# Patient Record
Sex: MALE | Race: WHITE
[De-identification: names, ages, dates, MRNs, and addresses within clinical notes are randomized per-mention and may not be internally consistent; named-entity substitution may affect disease eponyms.]

---

## 2018-05-14 ENCOUNTER — HOSPITAL ENCOUNTER (EMERGENCY)
Dept: HOSPITAL 45 - C.EDB | Age: 83
Discharge: HOME | End: 2018-05-14
Payer: COMMERCIAL

## 2018-05-14 VITALS — DIASTOLIC BLOOD PRESSURE: 67 MMHG | SYSTOLIC BLOOD PRESSURE: 177 MMHG | OXYGEN SATURATION: 95 % | HEART RATE: 60 BPM

## 2018-05-14 VITALS — TEMPERATURE: 97.7 F

## 2018-05-14 DIAGNOSIS — Z85.828: ICD-10-CM

## 2018-05-14 DIAGNOSIS — Z98.890: ICD-10-CM

## 2018-05-14 DIAGNOSIS — E78.5: ICD-10-CM

## 2018-05-14 DIAGNOSIS — I10: ICD-10-CM

## 2018-05-14 DIAGNOSIS — Z79.899: ICD-10-CM

## 2018-05-14 DIAGNOSIS — K21.9: ICD-10-CM

## 2018-05-14 DIAGNOSIS — R21: Primary | ICD-10-CM

## 2018-05-14 DIAGNOSIS — Z88.8: ICD-10-CM

## 2018-05-14 DIAGNOSIS — Z79.01: ICD-10-CM

## 2018-05-14 DIAGNOSIS — Z79.82: ICD-10-CM

## 2018-05-14 LAB
ALBUMIN SERPL-MCNC: 3.3 GM/DL (ref 3.4–5)
ALP SERPL-CCNC: 189 U/L (ref 45–117)
ALT SERPL-CCNC: 49 U/L (ref 12–78)
AST SERPL-CCNC: 50 U/L (ref 15–37)
BASOPHILS # BLD: 0.04 K/UL (ref 0–0.2)
BASOPHILS NFR BLD: 0.6 %
BUN SERPL-MCNC: 41 MG/DL (ref 7–18)
CALCIUM SERPL-MCNC: 8.6 MG/DL (ref 8.5–10.1)
CK MB SERPL-MCNC: 2 NG/ML (ref 0.5–3.6)
CO2 SERPL-SCNC: 22 MMOL/L (ref 21–32)
CREAT SERPL-MCNC: 2.13 MG/DL (ref 0.6–1.4)
EOS ABS #: 0.18 K/UL (ref 0–0.5)
EOSINOPHIL NFR BLD AUTO: 176 K/UL (ref 130–400)
GLUCOSE SERPL-MCNC: 150 MG/DL (ref 70–99)
HCT VFR BLD CALC: 33.4 % (ref 42–52)
HGB BLD-MCNC: 11.1 G/DL (ref 14–18)
IG#: 0.01 K/UL (ref 0–0.02)
IMM GRANULOCYTES NFR BLD AUTO: 9.6 %
INR PPP: 3.7 (ref 0.9–1.1)
LYMPHOCYTES # BLD: 0.6 K/UL (ref 1.2–3.4)
MCH RBC QN AUTO: 28.2 PG (ref 25–34)
MCHC RBC AUTO-ENTMCNC: 33.2 G/DL (ref 32–36)
MCV RBC AUTO: 84.8 FL (ref 80–100)
MONO ABS #: 0.56 K/UL (ref 0.11–0.59)
MONOCYTES NFR BLD: 8.9 %
NEUT ABS #: 4.87 K/UL (ref 1.4–6.5)
NEUTROPHILS # BLD AUTO: 2.9 %
NEUTROPHILS NFR BLD AUTO: 77.8 %
PMV BLD AUTO: 8.8 FL (ref 7.4–10.4)
POTASSIUM SERPL-SCNC: 5.2 MMOL/L (ref 3.5–5.1)
PROT SERPL-MCNC: 7.3 GM/DL (ref 6.4–8.2)
PTT PATIENT: 53.9 SECONDS (ref 21–31)
RED CELL DISTRIBUTION WIDTH CV: 15.6 % (ref 11.5–14.5)
RED CELL DISTRIBUTION WIDTH SD: 48.8 FL (ref 36.4–46.3)
SODIUM SERPL-SCNC: 139 MMOL/L (ref 136–145)
WBC # BLD AUTO: 6.26 K/UL (ref 4.8–10.8)

## 2018-05-14 NOTE — DIAGNOSTIC IMAGING REPORT
CHEST ONE VIEW PORTABLE



CLINICAL HISTORY: Fever. Sepsis.    



COMPARISON STUDY:  Chest radiograph November 1, 2012.



FINDINGS: Note is made of median sternotomy wires and mediastinal surgical

clips. No pneumothorax is present. A small right pleural effusion is noted.

Right infrahilar opacity is present. Note is made of prominent basilar

congestion with suspected mild pulmonary edema. There is moderate cardiomegaly.



IMPRESSION:  



1. Mild pulmonary edema.



2. Small right pleural effusion with right infrahilar opacity which may reflect

atelectasis or consolidation. Radiographic follow up to ensure resolution is

recommended.



3. Moderate cardiomegaly.









Electronically signed by:  Ty Klein M.D.

5/14/2018 12:49 PM



Dictated Date/Time:  5/14/2018 12:47 PM

## 2018-05-14 NOTE — EMERGENCY ROOM VISIT NOTE
History


Report prepared by Shashank:  Mel Paige


Under the Supervision of:  Dr. Vic Brush D.O.


First contact with patient:  12:04


Chief Complaint:  RASH


Stated Complaint:  DR THINKS HE MAY HAVE SHINGLES





History of Present Illness


The patient is an 87 year old male who presents to the Emergency Room with 

complaints of a worsening rash starting a few days ago. The patient states that 

he believes that he may have Shingles. He reports that the rash he has is very 

itchy. He states that the rash started on his back and has since spread down 

his arms. He states that he easily can rub them open. He reports that at the 

same time, his lips became dry and keep bleeding. He reports that he had to use 

a wet wash cloth this morning to get his lips open. The patient states that he 

is unsure how he got the shingles since he lives by himself. He reports that he 

came to the ED because his home health nurse thought it was shingles and said 

he needed to come to the ED. The patient reports that he takes Coumadin. The 

patient denies seeing his PCP for this rash.





   Source of History:  patient


   Onset:  a few days ago


   Position:  other (global)


   Quality:  other (rash, itching)


   Timing:  worsening


Note:


The patient complains of his lips being dry and bleeding.





Review of Systems


See HPI for pertinent positives & negatives. A total of 10 systems reviewed and 

were otherwise negative.





Past Medical & Surgical


Medical Problems:


(1) GERD (gastroesophageal reflux disease)


(2) HTN (hypertension)


(3) Hyperlipidemia


Surgical Problems:


(1) History of open heart surgery








Family History





No pertinent family history





Social History


Smoking Status:  Never Smoker


Drug Use:  none


Marital Status:  single


Housing Status:  lives alone


Occupation Status:  retired





Current/Historical Medications


Scheduled


Ascorbic Acid (Ascorbic Acid), 1,000 MG PO BID


Aspirin (Aspirin Chewable), 81 MG PO Q2D


Atorvastatin (Lipitor), 40 MG PO DAILY


Clonidine Hcl (Catapres), 0.2 MG PO TID


Finasteride (Proscar), 5 MG PO DAILY


Folic Acid (Folvite), 1 MG PO DAILY


Glimepiride (Amaryl), 4 MG PO DAILY


Hydrochlorothiazide (Hctz), 12.5 MG PO DAILY


Losartan Potassium (Cozaar), 100 MG PO DAILY


Metoprolol Tartrate (Lopressor), 100 MG PO BID


Multiple Vitamin (Multivitamin), 1 TAB PO DAILY


Pantoprazole Sodium (Protonix), 40 MG PO DAILY


Sennosides (Senokot), 8.6 MG PO BID


Sulfa/Trimethoprim (Bactrim Ds 800MG/160MG), 1 TAB PO BID


Tamsulosin Hcl (Flomax), 0.4 MG PO DAILY


Warfarin Sod (Jantoven), 5 MG PO DAILY





Scheduled PRN


Acetaminophen (Acetaminophen), 1 TAB PO Q6 PRN for Pain or Fever





Miscellaneous Medications


Simvastatin (Zocor)





Allergies


Coded Allergies:  


     Lisinopril (Verified  Allergy, Unknown, ., 5/14/18)





Physical Exam


Vital Signs











  Date Time  Temp Pulse Resp B/P (MAP) Pulse Ox O2 Delivery O2 Flow Rate FiO2


 


5/14/18 13:20  72 16 115/76 94 Room Air  


 


5/14/18 11:31 36.5 75 16 143/61 94 Room Air  











Physical Exam


CONSTITUTIONAL/VITAL SIGNS: Reviewed / noted above.


GENERAL: Non-toxic in appearance. 


INTEGUMENTARY: Warm, dry, and Pink. 4-5 erythematous blanching non raised 

lesions on the back as well as several similar appearing lesions on the 

bilateral upper extremities. Small lesion noted on the right angle of the lip.


HEAD: Normocephalic.


EYES: without scleral icterus or trauma.


ENT/OROPHARYNX: clear and moist. No intraoral lesions.


LYMPHADENOPATHY/NECK: Is supple without lymphadenopathy or meningismus.


RESPIRATORY: Lungs clear and equal.


CARDIOVASCULAR: Regular rate and rhythm.


GI/ABDOMEN: Soft and nontender. No organomegaly or pulsatile mass. No rebound 

or guarding. Normal bowel sounds.


EXTREMITIES: Warm and well perfused.


BACK: No CVA tenderness.


NEUROLOGICAL: Intact without focal deficits. 


PSYCHIATRIC: normal affect.


MUSCULOSKELETAL: Normally developed with good muscle tone.





Medical Decision & Procedures


ER Provider


Diagnostic Interpretation:


Radiology results as stated below per my review and radiologist interpretation:





CHEST ONE VIEW PORTABLE





CLINICAL HISTORY: Fever. Sepsis.    





COMPARISON STUDY:  Chest radiograph November 1, 2012.





FINDINGS: Note is made of median sternotomy wires and mediastinal surgical


clips. No pneumothorax is present. A small right pleural effusion is noted.


Right infrahilar opacity is present. Note is made of prominent basilar


congestion with suspected mild pulmonary edema. There is moderate cardiomegaly.





IMPRESSION:  





1. Mild pulmonary edema.





2. Small right pleural effusion with right infrahilar opacity which may reflect


atelectasis or consolidation. Radiographic follow up to ensure resolution is


recommended.





3. Moderate cardiomegaly.














Electronically signed by:  Ty Klein M.D.


5/14/2018 12:49 PM





Dictated Date/Time:  5/14/2018 12:47 PM





Laboratory Results


5/14/18 12:55








Red Blood Count 3.94, Mean Corpuscular Volume 84.8, Mean Corpuscular Hemoglobin 

28.2, Mean Corpuscular Hemoglobin Concent 33.2, Mean Platelet Volume 8.8, 

Neutrophils (%) (Auto) 77.8, Lymphocytes (%) (Auto) 9.6, Monocytes (%) (Auto) 

8.9, Eosinophils (%) (Auto) 2.9, Basophils (%) (Auto) 0.6, Neutrophils # (Auto) 

4.87, Lymphocytes # (Auto) 0.60, Monocytes # (Auto) 0.56, Eosinophils # (Auto) 

0.18, Basophils # (Auto) 0.04





5/14/18 12:55

















Test


  5/14/18


12:55


 


White Blood Count


  6.26 K/uL


(4.8-10.8)


 


Red Blood Count


  3.94 M/uL


(4.7-6.1)


 


Hemoglobin


  11.1 g/dL


(14.0-18.0)


 


Hematocrit 33.4 % (42-52) 


 


Mean Corpuscular Volume


  84.8 fL


()


 


Mean Corpuscular Hemoglobin


  28.2 pg


(25-34)


 


Mean Corpuscular Hemoglobin


Concent 33.2 g/dl


(32-36)


 


Platelet Count


  176 K/uL


(130-400)


 


Mean Platelet Volume


  8.8 fL


(7.4-10.4)


 


Neutrophils (%) (Auto) 77.8 % 


 


Lymphocytes (%) (Auto) 9.6 % 


 


Monocytes (%) (Auto) 8.9 % 


 


Eosinophils (%) (Auto) 2.9 % 


 


Basophils (%) (Auto) 0.6 % 


 


Neutrophils # (Auto)


  4.87 K/uL


(1.4-6.5)


 


Lymphocytes # (Auto)


  0.60 K/uL


(1.2-3.4)


 


Monocytes # (Auto)


  0.56 K/uL


(0.11-0.59)


 


Eosinophils # (Auto)


  0.18 K/uL


(0-0.5)


 


Basophils # (Auto)


  0.04 K/uL


(0-0.2)


 


RDW Standard Deviation


  48.8 fL


(36.4-46.3)


 


RDW Coefficient of Variation


  15.6 %


(11.5-14.5)


 


Immature Granulocyte % (Auto) 0.2 % 


 


Immature Granulocyte # (Auto)


  0.01 K/uL


(0.00-0.02)


 


Erythrocyte Sedimentation Rate


  45 mm/hr


(0-14)


 


Prothrombin Time


  37.8 SECONDS


(9.0-12.0)


 


Prothromb Time International


Ratio 3.7 (0.9-1.1) 


 


 


Activated Partial


Thromboplast Time 53.9 SECONDS


(21.0-31.0)


 


Partial Thromboplastin Ratio 2.1 


 


Anion Gap


  8.0 mmol/L


(3-11)


 


Estimated GFR (


American) 31.3 


 


 


Estimated GFR (Non-


American 27.0 


 


 


BUN/Creatinine Ratio 19.4 (10-20) 


 


Calcium Level


  8.6 mg/dl


(8.5-10.1)


 


Total Bilirubin


  0.4 mg/dl


(0.2-1)


 


Direct Bilirubin


  0.2 mg/dl


(0-0.2)


 


Aspartate Amino Transf


(AST/SGOT) 50 U/L (15-37) 


 


 


Alanine Aminotransferase


(ALT/SGPT) 49 U/L (12-78) 


 


 


Alkaline Phosphatase


  189 U/L


()


 


Total Creatine Kinase


  76 U/L


()


 


Creatine Kinase MB


  2.0 ng/ml


(0.5-3.6)


 


Creatine Kinase MB Ratio 2.6 (0-3.0) 


 


Troponin I


  < 0.015 ng/ml


(0-0.045)


 


C-Reactive Protein


  2.07 mg/dl


(0-0.29)


 


Total Protein


  7.3 gm/dl


(6.4-8.2)


 


Albumin


  3.3 gm/dl


(3.4-5.0)





Laboratory results as stated above per my review.





Medications Administered











 Medications


  (Trade)  Dose


 Ordered  Sig/Dalila


 Route  Start Time


 Stop Time Status Last Admin


Dose Admin


 


 Sodium Chloride  500 ml @ 


 999 mls/hr  Q31M STAT


 IV  5/14/18 14:14


 5/14/18 14:44 DC 5/14/18 14:18


999 MLS/HR











ED Course


1231: Previous medical records were reviewed. The patient was evaluated in room 

C7. A complete history and physical examination was performed.





1413: I reevaluated the patient and he is doing okay. I am going to speak to 

the patient's dermatologist. 





1414: Ordered  ml @ 999 mls/hr IV.





1439: I discussed the patient's case with Dr. Geiger -Dermatology. She states 

that the patient can see them on Monday at his scheduled appointment or they 

can see him sooner if he wants to call them.





1456: On reevaluation, the patient is resting comfortably. I discussed the 

results and findings with the patient. He verbalized agreement of the treatment 

plan. The patient was discharged home.





1500: Ordered Triamcinolone Acetonide 1 appln EXT.





Medical Decision


Differential diagnosis:


Etiologies such as contact dermatitis, viral exanthem, urticaria, allergic 

reaction, Solomon-Andres syndrome, toxic epidermal necrolysis, erythema 

multiforme, cellulitis, scabies, HSV, varicella, zoster, eczema, staph scalded 

skin syndrome, fungal infection, as well as others were entertained.





This is an 87-year-old male who presents to the ED with a chief complaint of a 

rash.  The patient reports it is somewhat itchy.  He has noticed that for the 

past couple of days.  The patient has no other specific complaints.  Denies any 

fevers or recent illness.  He does have a history of skin cancer.  He is seeing 

a dermatologist for this.  He is scheduled to see them on Monday.  The patient 

has no other acute symptoms.  The patient's sed rate and CRP are elevated.  His 

creatinine is slightly elevated at 2.1.  This is elevated above his baseline 

around 1.5.  BUN is 41.  Glucose is 150.  INR 3.7.  He is on Coumadin.  Chest x-

ray reveals a right infrahilar opacity versus atelectasis.  The patient was 

told the results of the test.  His rash is about quarter sized and erythematous 

in nature.  It is minimally raised at best.  The skin appears to be very 

fragile overlying the rash.  It is on his back, slightly on his chest and on 

his upper extremities.  There is no discharge.  The patient does not appear to 

be uncomfortable toxic.  I spoke with the patient's dermatologist Neena Geiger from the [facility] dermatology office in [place].  She gave them 

the number for the patient to call for follow-up to the nurses station or the 

patient can wait till Monday.  She did recommend triamcinolone cream.  This was 

ordered and the patient will be discharged.





Medication Reconcilliation


Current Medication List:  was personally reviewed by me





Blood Pressure Screening


Patient's blood pressure:  Elevated blood pressure


Blood pressure disposition:  Elevated BP felt to be situational





Consults


Time Called:  1422


Consulting Physician:  Dr. Juanjo Meadows


Returned Call:  [phone]


I discussed the patient's case with Dr. Juanjo Meadows. She states that 

the patient can see them on Monday at his scheduled appointment or they can see 

him sooner if he wants to call them.





Impression





 Primary Impression:  


 Rash





Scribe Attestation


The scribe's documentation has been prepared under my direction and personally 

reviewed by me in its entirety. I confirm that the note above accurately 

reflects all work, treatment, procedures, and medical decision making performed 

by me.





Departure Information


Dispostion


Home / Self-Care





Referrals


Justyn Mares M.D. (PCP)





Forms


HOME CARE DOCUMENTATION FORM,                                                 

               IMPORTANT VISIT INFORMATION, WORK / SCHOOL INSTRUCTIONS





Patient Instructions


My [facility]





Additional Instructions





Follow-up with Dr. Geiger on Monday as scheduled or you can be seen sooner by 

calling [phone].  





Triamcinolone cream: Apply the cream to the affected areas 2-4 times a day.





Follow-up with your doctor for further care and evaluation in 1-7 days.  Return 

to the emergency department for worsening or new symptoms or any concerns.


You have been examined and treated today on an emergency basis only. This is 

not a substitute for, or an effort to provide, complete comprehensive medical 

care. It is impossible to recognize and treat all injuries or illnesses in a 

single emergency department visit. It is therefore important that you follow up 

closely with your doctor.  Call as soon as possible for an appointment.